# Patient Record
Sex: FEMALE | Race: WHITE | NOT HISPANIC OR LATINO
[De-identification: names, ages, dates, MRNs, and addresses within clinical notes are randomized per-mention and may not be internally consistent; named-entity substitution may affect disease eponyms.]

---

## 2017-06-26 ENCOUNTER — APPOINTMENT (OUTPATIENT)
Dept: ORTHOPEDIC SURGERY | Facility: CLINIC | Age: 72
End: 2017-06-26

## 2017-06-26 VITALS
HEIGHT: 63 IN | DIASTOLIC BLOOD PRESSURE: 80 MMHG | WEIGHT: 182 LBS | HEART RATE: 79 BPM | BODY MASS INDEX: 32.25 KG/M2 | SYSTOLIC BLOOD PRESSURE: 138 MMHG

## 2017-06-26 VITALS
WEIGHT: 150 LBS | BODY MASS INDEX: 27.6 KG/M2 | HEIGHT: 62 IN | DIASTOLIC BLOOD PRESSURE: 67 MMHG | SYSTOLIC BLOOD PRESSURE: 106 MMHG | HEART RATE: 63 BPM

## 2017-06-26 DIAGNOSIS — Z96.651 PRESENCE OF RIGHT ARTIFICIAL KNEE JOINT: ICD-10-CM

## 2018-06-25 ENCOUNTER — APPOINTMENT (OUTPATIENT)
Dept: ORTHOPEDIC SURGERY | Facility: CLINIC | Age: 73
End: 2018-06-25
Payer: MEDICARE

## 2018-06-25 VITALS
SYSTOLIC BLOOD PRESSURE: 114 MMHG | HEIGHT: 63 IN | DIASTOLIC BLOOD PRESSURE: 67 MMHG | WEIGHT: 140 LBS | BODY MASS INDEX: 24.8 KG/M2 | HEART RATE: 80 BPM

## 2018-06-25 DIAGNOSIS — M25.562 PAIN IN RIGHT KNEE: ICD-10-CM

## 2018-06-25 DIAGNOSIS — M25.561 PAIN IN RIGHT KNEE: ICD-10-CM

## 2018-06-25 DIAGNOSIS — Z82.61 FAMILY HISTORY OF ARTHRITIS: ICD-10-CM

## 2018-06-25 DIAGNOSIS — Z78.9 OTHER SPECIFIED HEALTH STATUS: ICD-10-CM

## 2018-06-25 DIAGNOSIS — Z87.39 PERSONAL HISTORY OF OTHER DISEASES OF THE MUSCULOSKELETAL SYSTEM AND CONNECTIVE TISSUE: ICD-10-CM

## 2018-06-25 PROCEDURE — 99213 OFFICE O/P EST LOW 20 MIN: CPT

## 2018-06-25 PROCEDURE — 73562 X-RAY EXAM OF KNEE 3: CPT | Mod: RT

## 2018-06-25 PROCEDURE — 73560 X-RAY EXAM OF KNEE 1 OR 2: CPT | Mod: LT

## 2019-06-24 ENCOUNTER — APPOINTMENT (OUTPATIENT)
Dept: ORTHOPEDIC SURGERY | Facility: CLINIC | Age: 74
End: 2019-06-24
Payer: MEDICARE

## 2019-06-24 DIAGNOSIS — Z96.653 PRESENCE OF ARTIFICIAL KNEE JOINT, BILATERAL: ICD-10-CM

## 2019-06-24 DIAGNOSIS — M17.0 BILATERAL PRIMARY OSTEOARTHRITIS OF KNEE: ICD-10-CM

## 2019-06-24 PROCEDURE — 99213 OFFICE O/P EST LOW 20 MIN: CPT

## 2019-06-24 PROCEDURE — 73562 X-RAY EXAM OF KNEE 3: CPT | Mod: 50

## 2019-06-24 RX ORDER — DOXYCYCLINE HYCLATE 100 MG/1
100 TABLET ORAL
Qty: 14 | Refills: 0 | Status: ACTIVE | COMMUNITY
Start: 2019-06-08

## 2019-06-24 NOTE — ADDENDUM
[FreeTextEntry1] : This note was written by Melissa Byrne on 06/24/2019 acting as scribe for Dr. Bruce Uriarte M.D.\par \par I, Dr. Bruce Uriarte M.D., have read and attest that all the information, medical decision making and discharge instructions within are true and accurate.\par

## 2019-06-24 NOTE — PHYSICAL EXAM
[de-identified] : Left Knee\par Inspection: no effusion\par Wounds: healed midline incision\par Alignment: normal.\par Palpation: no specific tenderness on palpation.\par ROM: Active (in degrees) 0-110\par Ligamentous laxity (neg): negative ant. drawer test, negative post. drawer test, stable to varus stress test, stable to valgus stress test,\par Patellofemoral Alignment Test: Q angle-, normal.\par Muscle Test: good quad strength.\par Leg examination: calf is soft and non-tender.\par \par Right Knee\par Inspection: no effusion\par Wounds: healed midline incision\par Alignment: normal.\par Palpation: no specific tenderness on palpation.\par ROM: Active (in degrees) 0-125\par Ligamentous laxity (neg): negative ant. drawer test, negative post. drawer test, stable to varus stress test, stable to valgus stress test,\par Patellofemoral Alignment Test: Q angle-, normal.\par Muscle Test: good quad strength.\par Leg examination: calf is soft and non-tender.  [de-identified] : Left knee xrays,  AP, lateral, merchant view,  taken at the office today demonstrates a revision total knee replacement in satisfactory position and alignment. No evidence of loosening. The patella sits in a central position\par \par Right knee xrays,  AP, lateral, merchant view, taken at the office today demonstrates a revision total knee replacement in satisfactory position and alignment. No evidence of loosening. The patella sits in a central position

## 2019-06-24 NOTE — DISCUSSION/SUMMARY
[de-identified] : Patient is doing well following their left revision TKR at 9 years and right revision TKR at 2 years and is very happy. They will continue activities as tolerated. Patient will follow up with x-rays in 1 year. She nay be moving to [place] and requested the name of a doctor to follow up with, and I have referred her to Dr. Bright Gil.

## 2019-06-24 NOTE — HISTORY OF PRESENT ILLNESS
[de-identified] : 74 year old female presents for follow up evaluation of her left revision TKR at 9 years and right revision TKR at 2 years. Patient is doing well. She states she has lost 40 lbs over the last year. She denies any pains and states her knees and functioning great. Patient stays active with yoga and home exercise. She is here for an annual check.

## 2020-05-18 ENCOUNTER — APPOINTMENT (OUTPATIENT)
Dept: ORTHOPEDIC SURGERY | Facility: CLINIC | Age: 75
End: 2020-05-18

## 2024-07-08 ENCOUNTER — APPOINTMENT (OUTPATIENT)
Dept: ORTHOPEDIC SURGERY | Facility: CLINIC | Age: 79
End: 2024-07-08
Payer: MEDICARE

## 2024-07-08 VITALS — BODY MASS INDEX: 28.12 KG/M2 | HEIGHT: 63 IN | WEIGHT: 158.73 LBS

## 2024-07-08 DIAGNOSIS — Z96.653 PRESENCE OF ARTIFICIAL KNEE JOINT, BILATERAL: ICD-10-CM

## 2024-07-08 DIAGNOSIS — M17.0 BILATERAL PRIMARY OSTEOARTHRITIS OF KNEE: ICD-10-CM

## 2024-07-08 PROCEDURE — 99203 OFFICE O/P NEW LOW 30 MIN: CPT

## 2024-07-08 PROCEDURE — 73562 X-RAY EXAM OF KNEE 3: CPT | Mod: 50

## 2024-07-08 PROCEDURE — G2211 COMPLEX E/M VISIT ADD ON: CPT
